# Patient Record
Sex: FEMALE
[De-identification: names, ages, dates, MRNs, and addresses within clinical notes are randomized per-mention and may not be internally consistent; named-entity substitution may affect disease eponyms.]

---

## 2024-07-03 ENCOUNTER — APPOINTMENT (OUTPATIENT)
Dept: ORTHOPEDIC SURGERY | Facility: CLINIC | Age: 29
End: 2024-07-03

## 2024-07-03 PROBLEM — Z00.00 ENCOUNTER FOR PREVENTIVE HEALTH EXAMINATION: Status: ACTIVE | Noted: 2024-07-03

## 2024-08-09 ENCOUNTER — APPOINTMENT (OUTPATIENT)
Dept: ORTHOPEDIC SURGERY | Facility: CLINIC | Age: 29
End: 2024-08-09

## 2024-08-09 PROCEDURE — 73564 X-RAY EXAM KNEE 4 OR MORE: CPT | Mod: 50

## 2024-08-09 PROCEDURE — 99204 OFFICE O/P NEW MOD 45 MIN: CPT

## 2024-08-09 NOTE — DISCUSSION/SUMMARY
[de-identified] :     - We discussed their diagnosis and treatment options at length including the risks and benefits of both surgical and non-surgical options.  - We will continue conservative treatment with icing, anti-inflammatory medication, and PT   - The patient was provided with a prescription to work on hip ER/abductors strengthening along with quad/hamstring stretches and strengthening   - The patient was advised to let pain guide the gradual advancement of activities.   - Follow up as needed in 6 weeks to re-evaluate.  next visit if no improvement MR

## 2024-08-09 NOTE — HISTORY OF PRESENT ILLNESS
[de-identified] : Date of Injury/Onset: Patient is here for evaluation of bilateral knee pain with no LUCY. Patient reports left knee pain due to playing pickeball and reptitive motions of bending and picking up the ball around June of this year and playing volleyball consistently for the past 7 months has caused pain in both knees. Patient has not seen a physician yet but has been treated for the knees in PT.  Pain: At Rest: 3 With Activity: 8 Mechanism of injury: None This is NOT a Work Related Injury being treated under Worker's Compensation. This is NOT an athletic injury occurring associated with an interscholastic or organized sports team. Quality of symptoms: Sharp Pain Improves with: PT helps a bit Worse with: Stairs, Standing up, Volleyball Prior treatment: PT Prior Imaging: None Reports Available For Review Today: Out of work/sport: Still playing School/Sport/Position/Occupation: Desk job/Compliance Personal goal: Additional Information:

## 2024-08-09 NOTE — IMAGING
[de-identified] : LEFT KNEE  Inspection:  minimal effusion  Palpation: medial facet of the patella tenderness   Knee Range of Motion:  0-135  Strength: 5/5 Quadriceps strength, 5/5 Hamstring strength, 4/5 Hip Abductor strength  Neurological: light touch is intact throughout  Ligament Stability and Special Tests:   McMurrays: neg  Lachman: neg  Pivot Shift: neg  Posterior Drawer: neg  Valgus: neg  Varus: neg  Patella Apprehension: neg  Patella Maltracking: neg  RIGHT KNEE  Inspection:  minimal effusion  Palpation: medial facet of the patella tenderness   Knee Range of Motion:  0-135  Strength: 5/5 Quadriceps strength, 5/5 Hamstring strength, 4/5 Hip Abductor strength  Neurological: light touch is intact throughout  Ligament Stability and Special Tests:   McMurrays: neg  Lachman: neg  Pivot Shift: neg  Posterior Drawer: neg  Valgus: neg  Varus: neg  Patella Apprehension: neg  Patella Maltracking: neg

## 2024-08-09 NOTE — DATA REVIEWED
[FreeTextEntry1] : Bilateral X-Ray Examination of the KNEE (4 views): there are no fractures, subluxations or dislocations.

## 2024-09-20 ENCOUNTER — APPOINTMENT (OUTPATIENT)
Dept: ORTHOPEDIC SURGERY | Facility: CLINIC | Age: 29
End: 2024-09-20
Payer: COMMERCIAL

## 2024-09-20 DIAGNOSIS — M22.41 CHONDROMALACIA PATELLAE, RIGHT KNEE: ICD-10-CM

## 2024-09-20 DIAGNOSIS — M76.51 PATELLAR TENDINITIS, RIGHT KNEE: ICD-10-CM

## 2024-09-20 DIAGNOSIS — M22.42 CHONDROMALACIA PATELLAE, LEFT KNEE: ICD-10-CM

## 2024-09-20 DIAGNOSIS — M76.52 PATELLAR TENDINITIS, LEFT KNEE: ICD-10-CM

## 2024-09-20 PROCEDURE — 99213 OFFICE O/P EST LOW 20 MIN: CPT

## 2024-09-20 NOTE — HISTORY OF PRESENT ILLNESS
[de-identified] : Date of Injury/Onset: Patient is here for evaluation of bilateral knee pain with no LUCY. Patient reports left knee pain due to playing pickeball and reptitive motions of bending and picking up the ball around June of this year and playing volleyball consistently for the past 7 months has caused pain in both knees. Patient has not seen a physician yet but has been treated for the knees in PT.  Pain: At Rest: 3 With Activity: 8 Mechanism of injury: None This is NOT a Work Related Injury being treated under Worker's Compensation. This is NOT an athletic injury occurring associated with an interscholastic or organized sports team. Quality of symptoms: Sharp Pain Improves with: PT helps a bit Worse with: Stairs, Standing up, Volleyball Prior treatment: PT Prior Imaging: None Reports Available For Review Today: Out of work/sport: Still playing School/Sport/Position/Occupation: Desk job/Compliance  9/20/24: Patient is here to follow up on b/l knees. Attending PT, feels pain has improved, however, still present.

## 2024-09-20 NOTE — DISCUSSION/SUMMARY
[de-identified] :     - We discussed their diagnosis and treatment options at length including the risks and benefits of both surgical and non-surgical options.  - We will continue conservative treatment with icing, anti-inflammatory medication, and PT   - The patient was provided with a prescription to work on hip ER/abductors strengthening along with quad/hamstring stretches and strengthening   - The patient was advised to let pain guide the gradual advancement of activities.   - Follow up as needed in 6 weeks to re-evaluate.  next visit if no improvement MR *** 9/20 b/l PFS improving with PT rec: mobic + PT + shoeware modification + foam roller patella mobilization  RTC 6 weeks  if no improvement consider MR on more symptomatic side

## 2024-09-20 NOTE — IMAGING
[de-identified] : LEFT KNEE  Inspection:  minimal effusion  Palpation: medial facet of the patella tenderness   Knee Range of Motion:  0-135  Strength: 5/5 Quadriceps strength, 5/5 Hamstring strength, 4/5 Hip Abductor strength  Neurological: light touch is intact throughout  Ligament Stability and Special Tests:   McMurrays: neg  Lachman: neg  Pivot Shift: neg  Posterior Drawer: neg  Valgus: neg  Varus: neg  Patella Apprehension: neg  Patella Maltracking: neg  RIGHT KNEE  Inspection:  minimal effusion  Palpation: medial facet of the patella tenderness   Knee Range of Motion:  0-135  Strength: 5/5 Quadriceps strength, 5/5 Hamstring strength, 4/5 Hip Abductor strength  Neurological: light touch is intact throughout  Ligament Stability and Special Tests:   McMurrays: neg  Lachman: neg  Pivot Shift: neg  Posterior Drawer: neg  Valgus: neg  Varus: neg  Patella Apprehension: neg  Patella Maltracking: neg

## 2024-11-01 ENCOUNTER — APPOINTMENT (OUTPATIENT)
Dept: ORTHOPEDIC SURGERY | Facility: CLINIC | Age: 29
End: 2024-11-01